# Patient Record
Sex: MALE | Race: WHITE | URBAN - METROPOLITAN AREA
[De-identification: names, ages, dates, MRNs, and addresses within clinical notes are randomized per-mention and may not be internally consistent; named-entity substitution may affect disease eponyms.]

---

## 2021-05-03 ENCOUNTER — HOSPITAL ENCOUNTER (OUTPATIENT)
Dept: PREADMISSION TESTING | Age: 51
Discharge: HOME | End: 2021-05-03
Payer: COMMERCIAL

## 2021-05-03 PROCEDURE — U0005 INFEC AGEN DETEC AMPLI PROBE: HCPCS

## 2021-05-04 LAB — SARS-COV-2, COV2NT: NOT DETECTED

## 2021-05-07 ENCOUNTER — HOSPITAL ENCOUNTER (OUTPATIENT)
Age: 51
Setting detail: OUTPATIENT SURGERY
Discharge: HOME | End: 2021-05-07
Attending: SURGERY | Admitting: SURGERY
Payer: COMMERCIAL

## 2021-05-07 ENCOUNTER — ANESTHESIA EVENT (OUTPATIENT)
Dept: ENDOSCOPY | Age: 51
End: 2021-05-07
Payer: COMMERCIAL

## 2021-05-07 ENCOUNTER — ANESTHESIA (OUTPATIENT)
Dept: ENDOSCOPY | Age: 51
End: 2021-05-07
Payer: COMMERCIAL

## 2021-05-07 VITALS
HEIGHT: 74 IN | BODY MASS INDEX: 24.9 KG/M2 | DIASTOLIC BLOOD PRESSURE: 80 MMHG | TEMPERATURE: 97 F | RESPIRATION RATE: 18 BRPM | SYSTOLIC BLOOD PRESSURE: 124 MMHG | OXYGEN SATURATION: 99 % | WEIGHT: 194 LBS | HEART RATE: 55 BPM

## 2021-05-07 PROCEDURE — 77030039961 HC KT CUST COLON BSC -D: Performed by: SURGERY

## 2021-05-07 PROCEDURE — 76040000007: Performed by: SURGERY

## 2021-05-07 PROCEDURE — 76060000032 HC ANESTHESIA 0.5 TO 1 HR: Performed by: SURGERY

## 2021-05-07 PROCEDURE — 77030040361 HC SLV COMPR DVT MDII -B: Performed by: SURGERY

## 2021-05-07 PROCEDURE — 74011250636 HC RX REV CODE- 250/636: Performed by: SURGERY

## 2021-05-07 PROCEDURE — 2709999900 HC NON-CHARGEABLE SUPPLY: Performed by: SURGERY

## 2021-05-07 PROCEDURE — 74011250636 HC RX REV CODE- 250/636: Performed by: NURSE ANESTHETIST, CERTIFIED REGISTERED

## 2021-05-07 RX ORDER — FENTANYL CITRATE 50 UG/ML
50 INJECTION, SOLUTION INTRAMUSCULAR; INTRAVENOUS AS NEEDED
Status: CANCELLED | OUTPATIENT
Start: 2021-05-07 | End: 2021-05-11

## 2021-05-07 RX ORDER — SODIUM CHLORIDE 9 MG/ML
125 INJECTION, SOLUTION INTRAVENOUS CONTINUOUS
Status: DISCONTINUED | OUTPATIENT
Start: 2021-05-07 | End: 2021-05-07 | Stop reason: HOSPADM

## 2021-05-07 RX ORDER — PROCHLORPERAZINE EDISYLATE 5 MG/ML
5 INJECTION INTRAMUSCULAR; INTRAVENOUS ONCE
Status: CANCELLED | OUTPATIENT
Start: 2021-05-07 | End: 2021-05-07

## 2021-05-07 RX ORDER — SODIUM CHLORIDE 0.9 % (FLUSH) 0.9 %
5-40 SYRINGE (ML) INJECTION AS NEEDED
Status: CANCELLED | OUTPATIENT
Start: 2021-05-07

## 2021-05-07 RX ORDER — PROPOFOL 10 MG/ML
INJECTION, EMULSION INTRAVENOUS AS NEEDED
Status: DISCONTINUED | OUTPATIENT
Start: 2021-05-07 | End: 2021-05-07 | Stop reason: HOSPADM

## 2021-05-07 RX ORDER — SODIUM CHLORIDE 0.9 % (FLUSH) 0.9 %
5-40 SYRINGE (ML) INJECTION EVERY 8 HOURS
Status: CANCELLED | OUTPATIENT
Start: 2021-05-07

## 2021-05-07 RX ORDER — NALOXONE HYDROCHLORIDE 0.4 MG/ML
0.1 INJECTION, SOLUTION INTRAMUSCULAR; INTRAVENOUS; SUBCUTANEOUS AS NEEDED
Status: CANCELLED | OUTPATIENT
Start: 2021-05-07

## 2021-05-07 RX ORDER — DEXTROSE 50 % IN WATER (D50W) INTRAVENOUS SYRINGE
25-50 AS NEEDED
Status: CANCELLED | OUTPATIENT
Start: 2021-05-07

## 2021-05-07 RX ORDER — MAGNESIUM SULFATE 100 %
4 CRYSTALS MISCELLANEOUS AS NEEDED
Status: CANCELLED | OUTPATIENT
Start: 2021-05-07

## 2021-05-07 RX ORDER — FLUMAZENIL 0.1 MG/ML
0.2 INJECTION INTRAVENOUS
Status: CANCELLED | OUTPATIENT
Start: 2021-05-07

## 2021-05-07 RX ORDER — HYDROMORPHONE HYDROCHLORIDE 2 MG/ML
0.5 INJECTION, SOLUTION INTRAMUSCULAR; INTRAVENOUS; SUBCUTANEOUS
Status: CANCELLED | OUTPATIENT
Start: 2021-05-07

## 2021-05-07 RX ADMIN — PROPOFOL 40 MG: 10 INJECTION, EMULSION INTRAVENOUS at 13:28

## 2021-05-07 RX ADMIN — PROPOFOL 40 MG: 10 INJECTION, EMULSION INTRAVENOUS at 13:25

## 2021-05-07 RX ADMIN — PROPOFOL 150 MG: 10 INJECTION, EMULSION INTRAVENOUS at 13:16

## 2021-05-07 RX ADMIN — PROPOFOL 50 MG: 10 INJECTION, EMULSION INTRAVENOUS at 13:23

## 2021-05-07 RX ADMIN — PROPOFOL 40 MG: 10 INJECTION, EMULSION INTRAVENOUS at 13:32

## 2021-05-07 RX ADMIN — PROPOFOL 50 MG: 10 INJECTION, EMULSION INTRAVENOUS at 13:19

## 2021-05-07 RX ADMIN — SODIUM CHLORIDE: 900 INJECTION, SOLUTION INTRAVENOUS at 13:03

## 2021-05-07 NOTE — H&P
Assessment/Plan  # Detail Type Description    1. Assessment Encounter for screening for malignant neoplasm of colon (Z12.11). 2. Assessment Body mass index (BMI) 28.0-28.9, adult (B00.84). Plan Orders Today's instructions / counseling include(s) Dietary management education, guidance, and counseling. This 44year old male presents for Colon Cancer Screen. History of Present Illness  1. Colon Cancer Screen   No prior screening. Risk Factors: Paternal grandparent. There are no associated symptoms. Pertinent negatives include weight loss. Additional information: Patient has family history of colon cancer and Hx of chewing tobacco (stop 2016). Family History: Patient has no family history of colon cancer paternal grandparent. Symptoms: Patient has no complaints of hematochezia, melena, no constipation, no diarrhea, no history of using laxatives or other stimulants. GI Complaints: None    Prior GI Testing: None None    Hx of blood disorders: None    Hx of cardiac disease: None    Problems with anesthesia in the past: No-surgical hx    Sleep apnea: None    Hearing loss: none    Hx of tobacco:. old chewing tobacco        Problem List  Problem List reviewed. Past Medical/Surgical History   (Detailed)      Family History   (Detailed)    Relationship Family Member Name  Age at Death Condition Onset Age Cause of Death       Family history of Cancer, colon  N       Family history of Hypertension  N   Mother  N  Alive and well     Mother  N  Lupus erythematosus  N     Social History  (Detailed)  Preferred language is English. Marital Status/Family/Social Support  Marital status:      Smoking status: Former smoker. Alcohol  There is a history of alcohol use. Type: Beer and liquor. consumed occasionally. Caffeine  The patient uses caffeine: coffee - 4 cups a day.       Medications (active prior to today)  Medication Instructions Start Date Stop Date Refilled Elsewhere   lysine 500 mg tablet  //   Y         Allergies  Ingredient Reaction (Severity) Medication Name Comment   MINOCYCLINE HCL Rash       Reviewed, no changes. Review of Systems  System Neg/Pos Details   Constitutional Negative Fever, Night sweats and Weight loss. ENMT Negative Hearing loss, Tinnitus, Vertigo and Voice change. Eyes Negative Diplopia and Vision loss. Respiratory Negative Asthma, Cough, Dyspnea, Hemoptysis, Known TB exposure and Wheezing. Cardio Negative Chest pain, Claudication, Edema, Irregular heartbeat/palpitations and Thrombophlebitis. GI Negative Bloating, Dysphagia, Hemorrhoids, Jaundice and Reflux.  Negative Dysuria, Nocturia, Passage stone/gravel and Urinary incontinence. Endocrine Negative Cold intolerance and Goiter. Neuro Negative Focal weakness, Headache, Paresthesia, Seizures and Syncope. Integumentary Negative Change in shape/size of mole(s) and Skin lesion. MS Negative Back pain, Bone/joint symptoms and Muscle weakness. Hema/Lymph Negative Easy bleeding and Easy bruising. Allergic/Immuno Negative Contact allergy and Contact dermatitis.        Vital Signs   Height  Time ft in cm Last Measured Height Position   10:41 AM 6.0 2.00 187.96 03/15/2021 Standing     Weight/BSA/BMI  Time lb oz kg Context BMI kg/m2 BSA m2   10:41 .20  99.427 dressed with shoes 28.14      Blood Pressure  Time BP mm/Hg Position Side Site Method Cuff Size   10:41 /82 sitting left brachial manual adult     Temperature/Pulse/Respiration  Time Temp F Temp C Temp Site Pulse/min Pattern Resp/ min   10:41 AM 97.90 36.61 infrared 75 regular      Pulse Oximetry/FIO2  Time Pulse Ox (Rest %) Pulse Ox (Amb %) O2 Sat O2 L/Min Timing FiO2 % L/min Delivery Method Finger Probe   10:41 AM 98  RA      R Index     Pain Scale  Time Pain Score Method   10:41 AM 0/10 Numeric Pain Intensity Scale     Measured by  Time Measured by   10:41 AM Gwendolyn Sun     Physical Exam  Exam Findings Details   Constitutional Normal Well developed. Eyes Normal Conjunctiva - Right: Normal, Left: Normal. Pupil - Right: Normal, Left: Normal.   Ears Normal Inspection - Right: Normal, Left: Normal. Canal - Right: Normal, Left: Normal. TM - Right: Normal, Left: Normal. Hearing - Right: Normal, Left: Normal.   Nose/Mouth/Throat Normal External nose - Normal. Lips/teeth/gums - Normal. Tonsils - Normal. Oropharynx - Normal.   Neck Exam Normal Inspection - Normal. Palpation - Normal. Thyroid gland - Normal.   Lymph Detail Normal No cervical or supraclavicular adenopathy. Breast Normal Inspection - Bilateral: Normal.   Respiratory Normal Inspection - Normal. Auscultation - Normal. Effort - Normal.   Cardiovascular Normal Regular rate and rhythm. No murmurs, gallops, or rubs. Vascular Normal Pulses - Dorsalis pedis: Normal.   Abdomen Normal Inspection - Normal. Auscultation - Normal. No abdominal tenderness. No hepatic enlargement. No spleen enlargement. Genitourinary Normal Penis - Normal. Scrotum - Normal. Testes - Normal.   Rectal Normal Prostate - Normal.   Skin Normal Inspection - Normal.   Musculoskeletal Normal Visual overview of all four extremities is normal.   Extremity Normal No edema. Neurological Normal Memory - Normal. Sensory - Normal. DTRs - Normal.   Psychiatric Normal Orientation - Oriented to time, place, person & situation. Appropriate mood and affect. Normal insight. Normal judgment.          Medications (added, continued, or stopped this visit)  Start Date Medication Directions PRN Status PRN Reason Instruction Stop Date    lysine 500 mg tablet  N        Active Patient Care Team Members  Name Contact Agency Type Support Role Relationship Active Date Inactive Date Specialty   Mame Gill   Patient provider PCP   [facility]

## 2021-05-07 NOTE — ANESTHESIA POSTPROCEDURE EVALUATION
Post-Anesthesia Evaluation and Assessment    Cardiovascular Function/Vital Signs  Visit Vitals  /80   Pulse (!) 55   Temp 36.1 °C (97 °F)   Resp 18   Ht 6' 2\" (1.88 m)   Wt 88 kg (194 lb)   SpO2 99%   BMI 24.91 kg/m²       Patient is status post Procedure(s):  COLONOSCOPY W/MAC. Nausea/Vomiting: Controlled. Postoperative hydration reviewed and adequate. Pain:  Pain Scale 1: Numeric (0 - 10) (05/07/21 1402)  Pain Intensity 1: 0 (05/07/21 1402)   Managed. Neurological Status: At baseline. Mental Status and Level of Consciousness: Arousable. Pulmonary Status:   O2 Device: None (Room air) (05/07/21 1402)   Adequate oxygenation and airway patent. Complications related to anesthesia: None    Post-anesthesia assessment completed. No concerns. Patient has met all discharge requirements. Signed By: Javan Meyer MD    May 7, 2021               Procedure(s):  COLONOSCOPY [facility].     MAC    <BSHSIANPOST>    INITIAL Post-op Vital signs:   Vitals Value Taken Time   /80 05/07/21 1402   Temp 36.1 °C (97 °F) 05/07/21 1349   Pulse 55 05/07/21 1402   Resp 18 05/07/21 1402   SpO2 99 % 05/07/21 1402

## 2021-05-07 NOTE — DISCHARGE INSTRUCTIONS
Kuldip Levine  [id number]  1970    COLON DISCHARGE INSTRUCTIONS    Discomfort:  Redness at IV site- apply warm compress to area; if redness or soreness persist- contact your physician  There may be a slight amount of blood passed from the rectum  Gaseous discomfort- walking, belching will help relieve any discomfort  You should not operate a vehicle for 12 hours  You should not engage in an occupation involving machinery or appliances for rest of today  You may not drink alcoholic beverages for at least 12 hours  Avoid making any critical decisions for at least 24 hour  DIET:   Regular or resume diet normally appropriate for you. - however -  remember your colon is empty and a heavy meal will produce gas. Avoid these foods:  vegetables, fried / greasy foods, carbonated drinks for today     ACTIVITY:  You may resume your normal daily activities it is recommended that you spend the remainder of the day resting -  avoid any strenuous activity. CALL M.D. ANY SIGN OF:   Increasing pain, nausea, vomiting  Abdominal distension (swelling)  New increased bleeding (oral or rectal)  Fever (chills)  Pain in chest area  Bloody discharge from nose or mouth  Shortness of breath      Follow-up Instructions:   If biopsies were taken, please call Dr. Manohar Spencer office in next 2 weeks, [phone]. Otherwise follow up with your PCM.   Kuldip Levine  [id number]  1970        DISCHARGE SUMMARY from Nurse    The following personal items collected during your admission are returned to you:   Dental Appliance: Dental Appliances: None  Vision: Visual Aid: Glasses, With patient  Hearing Aid:    Jewelry:    Clothing:    Other Valuables:    Valuables sent to safe:            DISCHARGE SUMMARY from Nurse    PATIENT INSTRUCTIONS:    After general anesthesia or intravenous sedation, for 24 hours or while taking prescription Narcotics:  · Limit your activities  · Do not drive and operate hazardous machinery  · Do not make important personal or business decisions  · Do  not drink alcoholic beverages  · If you have not urinated within 8 hours after discharge, please contact your surgeon on call. Report the following to your surgeon:  · Excessive pain, swelling, redness or odor of or around the surgical area  · Temperature over 100.5  · Nausea and vomiting lasting longer than 4 hours or if unable to take medications  · Any signs of decreased circulation or nerve impairment to extremity: change in color, persistent  numbness, tingling, coldness or increase pain  · Any questions    What to do at Home:  Recommended activity: Activity as tolerated and no driving for today    *  Please give a list of your current medications to your Primary Care Provider. *  Please update this list whenever your medications are discontinued, doses are      changed, or new medications (including over-the-counter products) are added. *  Please carry medication information at all times in case of emergency situations. These are general instructions for a healthy lifestyle:    No smoking/ No tobacco products/ Avoid exposure to second hand smoke  Surgeon General's Warning:  Quitting smoking now greatly reduces serious risk to your health. Obesity, smoking, and sedentary lifestyle greatly increases your risk for illness    A healthy diet, regular physical exercise & weight monitoring are important for maintaining a healthy lifestyle    You may be retaining fluid if you have a history of heart failure or if you experience any of the following symptoms:  Weight gain of 3 pounds or more overnight or 5 pounds in a week, increased swelling in our hands or feet or shortness of breath while lying flat in bed. Please call your doctor as soon as you notice any of these symptoms; do not wait until your next office visit. The discharge information has been reviewed with the patient and spouse. The patient and spouse verbalized understanding.   Discharge medications reviewed with the patient and spouse and appropriate educational materials and side effects teaching were provided.   ___________________________________________________________________________________________________________________________________    Patient armband removed and shredded

## 2021-05-07 NOTE — PROCEDURES
Colonoscopy Procedure Note    Indications: screening for colorectal cancer    Surgeon(s): Surgeon(s) and Role:     Adele Mcpherson DO - [place]    Assistant(s): Endoscopy Technician-1: Angel Andre  Endoscopy RN-1: Saul Prater    Anesthesia: MAC     Pre-Procedure Exam:  Airway: clear   Heart: normal S1and S2    Lungs: clear bilateral  Abdomen: soft, nontender, bowel sounds present and normal in all quadrants   Mental Status: awake, alert, and oriented to person, place, and time      Procedure in Detail:  Informed consent was obtained for the procedure, including sedation. Risks of perforation, hemorrhage, adverse drug reaction, and aspiration were discussed. The patient was placed in the left lateral decubitus position. Based on the pre-procedure assessment, including review of the patient's medical history, medications, allergies, and review of systems, he had been deemed to be an appropriate candidate for moderate sedation; he was therefore sedated with the medications listed above. The patient was monitored continuously with ECG tracing, pulse oximetry, blood pressure monitoring, and direct observations. A digital rectal examination was performed. The [id number] was inserted into the rectum and advanced under direct vision to the cecum, which was identified by the ileocecal valve and appendiceal orifice. The quality of the colonic preparation was good. A careful inspection of the mucosa was made as the colonoscope was withdrawn, including a careful and complete straight view of the rectum and anorectal junction; findings and interventions are described below. Appropriate photodocumentation was obtained. Findings:   Rectum: Normal, internal hemorrhoids gr 2-3  Sigmoid: Normal  Descending Colon: Normal  Transverse Colon: Normal  Ascending Colon: Normal  Cecum: Normal  Terminal Ileum: Not entered    Specimens: No specimens were collected.        EBL: None    Complications: None; patient tolerated the procedure well. Recommendations:   - For colon cancer screening in this average-risk patient, colonoscopy may be repeated in 10 years. - Follow up with primary care physician. Signed By: DR. Berenice Flores, [facility] Colorectal Surgery                       5/7/2021

## (undated) DEVICE — Device

## (undated) DEVICE — MAJ-1414 SINGLE USE ADPATER BIOPSY VALV: Brand: SINGLE USE ADAPTOR BIOPSY VALVE

## (undated) DEVICE — SPONGE GZ W4XL4IN RAYON POLY 4 PLY NONWOVEN FASTER WICKING

## (undated) DEVICE — KENDALL RADIOLUCENT FOAM MONITORING ELECTRODE RECTANGULAR SHAPE: Brand: KENDALL

## (undated) DEVICE — SYR 3ML LL TIP 1/10ML GRAD --

## (undated) DEVICE — SET ADMIN 16ML TBNG L100IN 2 Y INJ SITE IV PIGGY BK DISP

## (undated) DEVICE — TRNQT TEXT 1X18IN BLU LF DISP -- CONVERT TO ITEM 362165

## (undated) DEVICE — SYRINGE 50ML E/T

## (undated) DEVICE — CATH IV SAFE STR 22GX1IN BLU -- PROTECTIV PLUS

## (undated) DEVICE — WRISTBAND ID AD W2.5XL9.5CM RED VYN ADH CLSR UNI-PRINT

## (undated) DEVICE — GARMENT,MEDLINE,DVT,INT,CALF,MED, GEN2: Brand: MEDLINE

## (undated) DEVICE — SINGLE PORT MANIFOLD: Brand: NEPTUNE 2

## (undated) DEVICE — SYR 5ML 1/5 GRAD LL NSAF LF --

## (undated) DEVICE — NDL PRT INJ NSAF BLNT 18GX1.5 --

## (undated) DEVICE — NDL FLTR TIP 5 MIC 18GX1.5IN --

## (undated) DEVICE — TRAP SPEC COLL POLYP POLYSTYR --

## (undated) DEVICE — SPONGE GZ W4XL4IN COT 12 PLY TYP VII WVN C FLD DSGN

## (undated) DEVICE — SOLUTION IV 500ML 0.9% SOD CHL FLX CONT

## (undated) DEVICE — CATH SUC CTRL PRT TRIFLO 14FR --

## (undated) DEVICE — TUBING, SUCTION, 1/4" X 12', STRAIGHT: Brand: MEDLINE

## (undated) DEVICE — CANNULA CUSH AD W/ 14FT TBG